# Patient Record
Sex: MALE | Race: WHITE | Employment: OTHER | URBAN - METROPOLITAN AREA
[De-identification: names, ages, dates, MRNs, and addresses within clinical notes are randomized per-mention and may not be internally consistent; named-entity substitution may affect disease eponyms.]

---

## 2020-12-08 ENCOUNTER — OFFICE VISIT (OUTPATIENT)
Dept: PRIMARY CARE CLINIC | Age: 70
End: 2020-12-08

## 2020-12-08 PROCEDURE — 99211 OFF/OP EST MAY X REQ PHY/QHP: CPT | Performed by: NURSE PRACTITIONER

## 2020-12-09 LAB — SARS-COV-2, NAA: NOT DETECTED

## 2020-12-23 ENCOUNTER — OFFICE VISIT (OUTPATIENT)
Dept: PRIMARY CARE CLINIC | Age: 70
End: 2020-12-23

## 2020-12-23 PROCEDURE — 99211 OFF/OP EST MAY X REQ PHY/QHP: CPT | Performed by: NURSE PRACTITIONER

## 2020-12-24 LAB — SARS-COV-2, NAA: NOT DETECTED

## 2022-06-12 ENCOUNTER — HOSPITAL ENCOUNTER (EMERGENCY)
Age: 72
Discharge: HOME | End: 2022-06-12
Payer: MEDICARE

## 2022-06-12 VITALS
RESPIRATION RATE: 16 BRPM | OXYGEN SATURATION: 95 % | DIASTOLIC BLOOD PRESSURE: 79 MMHG | HEIGHT: 72 IN | SYSTOLIC BLOOD PRESSURE: 151 MMHG | WEIGHT: 207.01 LBS | BODY MASS INDEX: 28.04 KG/M2 | TEMPERATURE: 97.9 F | HEART RATE: 61 BPM

## 2022-06-12 DIAGNOSIS — H00.036 EYELID ABSCESS, LEFT: Primary | ICD-10-CM

## 2022-06-12 PROCEDURE — 99283 EMERGENCY DEPT VISIT LOW MDM: CPT

## 2022-06-12 PROCEDURE — 2500000003 HC RX 250 WO HCPCS: Performed by: GENERAL ACUTE CARE HOSPITAL

## 2022-06-12 PROCEDURE — 6370000000 HC RX 637 (ALT 250 FOR IP): Performed by: GENERAL ACUTE CARE HOSPITAL

## 2022-06-12 RX ORDER — AMOXICILLIN AND CLAVULANATE POTASSIUM 875; 125 MG/1; MG/1
1 TABLET, FILM COATED ORAL ONCE
Status: COMPLETED | OUTPATIENT
Start: 2022-06-12 | End: 2022-06-12

## 2022-06-12 RX ORDER — PROPARACAINE HYDROCHLORIDE 5 MG/ML
1 SOLUTION/ DROPS OPHTHALMIC ONCE
Status: COMPLETED | OUTPATIENT
Start: 2022-06-12 | End: 2022-06-12

## 2022-06-12 RX ORDER — SULFACETAMIDE SODIUM 100 MG/ML
2 SOLUTION/ DROPS OPHTHALMIC 4 TIMES DAILY
Status: DISCONTINUED | OUTPATIENT
Start: 2022-06-12 | End: 2022-06-12 | Stop reason: HOSPADM

## 2022-06-12 RX ORDER — AMLODIPINE BESYLATE 5 MG/1
5 TABLET ORAL DAILY
COMMUNITY

## 2022-06-12 RX ORDER — AMOXICILLIN AND CLAVULANATE POTASSIUM 875; 125 MG/1; MG/1
1 TABLET, FILM COATED ORAL 2 TIMES DAILY
Qty: 20 TABLET | Refills: 0 | Status: SHIPPED | OUTPATIENT
Start: 2022-06-12 | End: 2022-06-22

## 2022-06-12 RX ORDER — AMOXICILLIN AND CLAVULANATE POTASSIUM 875; 125 MG/1; MG/1
1 TABLET, FILM COATED ORAL EVERY 12 HOURS SCHEDULED
Status: DISCONTINUED | OUTPATIENT
Start: 2022-06-12 | End: 2022-06-12

## 2022-06-12 RX ORDER — ATORVASTATIN CALCIUM 10 MG/1
10 TABLET, FILM COATED ORAL DAILY
COMMUNITY

## 2022-06-12 RX ORDER — ACETAMINOPHEN 500 MG
1000 TABLET ORAL ONCE
Status: COMPLETED | OUTPATIENT
Start: 2022-06-12 | End: 2022-06-12

## 2022-06-12 RX ORDER — GLYBURIDE 5 MG/1
10 TABLET ORAL
COMMUNITY

## 2022-06-12 RX ADMIN — FLUORESCEIN SODIUM 1 MG: 1 STRIP OPHTHALMIC at 09:00

## 2022-06-12 RX ADMIN — SULFACETAMIDE SODIUM 2 DROP: 100 SOLUTION/ DROPS OPHTHALMIC at 08:59

## 2022-06-12 RX ADMIN — ACETAMINOPHEN 1000 MG: 500 TABLET ORAL at 09:11

## 2022-06-12 RX ADMIN — PROPARACAINE HYDROCHLORIDE 1 DROP: 5 SOLUTION/ DROPS OPHTHALMIC at 09:00

## 2022-06-12 RX ADMIN — AMOXICILLIN AND CLAVULANATE POTASSIUM 1 TABLET: 875; 125 TABLET, FILM COATED ORAL at 09:11

## 2022-06-12 ASSESSMENT — PAIN DESCRIPTION - ORIENTATION: ORIENTATION: LEFT

## 2022-06-12 ASSESSMENT — PAIN DESCRIPTION - DESCRIPTORS: DESCRIPTORS: ACHING

## 2022-06-12 ASSESSMENT — VISUAL ACUITY
OD: 20/40
OS: 20/30
OU: 20/40

## 2022-06-12 ASSESSMENT — PAIN DESCRIPTION - LOCATION: LOCATION: EYE

## 2022-06-12 ASSESSMENT — PAIN SCALES - GENERAL: PAINLEVEL_OUTOF10: 7

## 2022-06-12 NOTE — ED PROVIDER NOTES
[address] [facility]        Pt Name: Vanna Turner  MRN: [id number]  [place] 1950  Date of evaluation: 6/12/2022  Provider: RENE Adan CNP  PCP: Willard Gaucher  Note Started: 8:49 AM EDT       CASSANDRA. I have evaluated this patient. My supervising physician was available for consultation. CHIEF COMPLAINT       Chief Complaint   Patient presents with    Eye Pain     lower left eye lid pain and swelling x 2 dys. mild swelling and tenderness        HISTORY OF PRESENT ILLNESS   (Location, Timing/Onset, Context/Setting, Quality, Duration, Modifying Factors, Severity, Associated Signs and Symptoms)  Note limiting factors. Chief Complaint: Eyelid pain, swelling, and discharge    Vanna Turner is a 73 y.o. male who presents to the emergency department today reporting 2-day history of left eyelid pain, swelling, and discharge. There has been no injury. He states that he has never had anything like this in the past.  He does not wear corrective lenses. Patient states that he woke up this morning and his eye was \"matted shut\". Patient reports using a warm washcloth to remove the exudate off of his eye. He reports pain when touching the affected area. He denies pain with eye movement. He denies decreased vision. There has been no nausea, vomiting, diarrhea. He denies fever, chills, or other symptoms. Nursing Notes were all reviewed and agreed with or any disagreements were addressed in the HPI. REVIEW OF SYSTEMS    (2-9 systems for level 4, 10 or more for level 5)     Review of Systems   Constitutional: Negative for chills and fever. HENT: Negative for congestion, ear discharge, ear pain, facial swelling, hearing loss, nosebleeds, sinus pressure, sinus pain, sore throat and voice change. Eyes: Positive for pain, discharge, redness and itching. Negative for photophobia and visual disturbance.    Respiratory: Negative for cough, chest tightness, shortness of breath and wheezing. Cardiovascular: Negative for chest pain and palpitations. Gastrointestinal: Negative for abdominal pain, nausea and vomiting. Endocrine: Negative for polydipsia and polyuria. Genitourinary: Negative for difficulty urinating, dysuria and flank pain. Musculoskeletal: Negative for back pain and neck pain. Skin: Negative for rash and wound. Allergic/Immunologic: Negative for immunocompromised state. Neurological: Negative for dizziness, weakness and light-headedness. Hematological: Does not bruise/bleed easily. Psychiatric/Behavioral: Negative for suicidal ideas. Positives and Pertinent negatives as per HPI. Except as noted above in the ROS, all other systems were reviewed and negative. PAST MEDICAL HISTORY     Past Medical History:   Diagnosis Date    Blood clots     Cancer ([facility] [address]     Diabetes     High blood pressure          SURGICAL HISTORY     Past Surgical History:   Procedure Laterality Date    PROSTATE SURGERY  jan 2006         [address]       Discharge Medication List as of 6/12/2022  8:59 AM      CONTINUE these medications which have NOT CHANGED    Details   atorvastatin (LIPITOR) 10 MG tablet Take 10 mg by mouth dailyHistorical Med      PREDNISONE PO Take by mouth Takes for goutHistorical Med      amLODIPine (NORVASC) 5 MG tablet Take 5 mg by mouth daily Not sure on doseHistorical Med      glyBURIDE (DIABETA) 5 MG tablet Take 10 mg by mouth daily (with breakfast)Historical Med      simvastatin (ZOCOR) 80 MG tablet Take 80 mg by mouth nightly. furosemide (LASIX) 20 MG tablet Take 20 mg by mouth.      metformin (GLUCOPHAGE) 500 MG tablet Take 500 mg by mouth.      lisinopril (PRINIVIL;ZESTRIL) 40 MG tablet Take 40 mg by mouth daily.                ALLERGIES     Allopurinol    FAMILYHISTORY       Family History   Problem Relation Age of Onset    Cancer Father     Diabetes Father     High Blood Pressure Father     Kidney Disease Father           SOCIAL HISTORY       Social History     Tobacco Use    Smoking status: Never Smoker    Smokeless tobacco: Never Used   Vaping Use    Vaping Use: Never used   Substance Use Topics    Alcohol use: Yes    Drug use: Never       SCREENINGS    Abhilash Coma Scale  Eye Opening: Spontaneous  Best Verbal Response: Oriented  Best Motor Response: Obeys commands  Abhilash Coma Scale Score: 15        PHYSICAL EXAM    (up to 7 for level 4, 8 or more for level 5)     ED Triage Vitals [06/12/22 0728]   BP Temp Temp Source Heart Rate Resp SpO2 Height Weight   (!) 151/79 97.9 °F (36.6 °C) Oral 61 16 95 % 6' (1.829 m) 207 lb 0.2 oz (93.9 kg)       Physical Exam  Vitals and nursing note reviewed. Constitutional:       General: He is not in acute distress. Appearance: Normal appearance. He is not ill-appearing. HENT:      Head: Normocephalic and atraumatic. Right Ear: External ear normal.      Left Ear: External ear normal.      Nose: Nose normal.      Mouth/Throat:      Mouth: Mucous membranes are moist.      Pharynx: Oropharynx is clear. Eyes:      General: Lids are everted, no foreign bodies appreciated. Vision grossly intact. Gaze aligned appropriately. No allergic shiner, visual field deficit or scleral icterus. Right eye: No foreign body or discharge. Left eye: Discharge present. No foreign body. Extraocular Movements: Extraocular movements intact. Left eye: Normal extraocular motion and no nystagmus. Conjunctiva/sclera:      Left eye: Left conjunctiva is injected. Exudate present. No chemosis or hemorrhage. Pupils: Pupils are equal, round, and reactive to light. Left eye: No corneal abrasion or fluorescein uptake. Helder exam negative. Comments: Approximate 0.5 cm abscess noted to left inner eyelid. There is tenderness to palpation. Copious amount of white discharge noted with palpation to this area.     Cardiovascular: Rate and Rhythm: Normal rate and regular rhythm. Pulses: Normal pulses. Heart sounds: Normal heart sounds. Pulmonary:      Effort: Pulmonary effort is normal. No respiratory distress. Breath sounds: Normal breath sounds. Abdominal:      Palpations: Abdomen is soft. Tenderness: There is no abdominal tenderness. Musculoskeletal:         General: No tenderness. Normal range of motion. Cervical back: Normal range of motion and neck supple. No rigidity or tenderness. Right lower leg: No edema. Left lower leg: No edema. Skin:     General: Skin is warm and dry. Capillary Refill: Capillary refill takes less than 2 seconds. Neurological:      General: No focal deficit present. Mental Status: He is alert and oriented to person, place, and time. Psychiatric:         Mood and Affect: Mood normal.         Behavior: Behavior normal.         Thought Content: Thought content normal.         Judgment: Judgment normal.         DIAGNOSTIC RESULTS   LABS:    Labs Reviewed - No data to display    When ordered only abnormal lab results are displayed. All other labs were within normal range or not returned as of this dictation. EKG: When ordered, EKG's are interpreted by the Emergency Department Physician in the absence of a cardiologist.  Please see their note for interpretation of EKG. RADIOLOGY:   Non-plain film images such as CT, Ultrasound and MRI are read by the radiologist. Plain radiographic images are visualized and preliminarily interpreted by the ED Provider with the below findings:        Interpretation per the Radiologist below, if available at the time of this note:    No orders to display     No results found.         PROCEDURES   Unless otherwise noted below, none     Procedures    CRITICAL CARE TIME   none    CONSULTS:  None      EMERGENCY DEPARTMENT COURSE and DIFFERENTIAL DIAGNOSIS/MDM:   Vitals:    Vitals:    06/12/22 0728   BP: (!) 151/79   Pulse: 61 Resp: 16   Temp: 97.9 °F (36.6 °C)   TempSrc: Oral   SpO2: 95%   Weight: 207 lb 0.2 oz (93.9 kg)   Height: 6' (1.829 m)       Patient was given the following medications:  Medications   proparacaine (ALCAINE) 0.5 % ophthalmic solution 1 drop (1 drop Left Eye Given by Other 6/12/22 0900)   fluorescein ophthalmic strip 1 mg (1 mg Left Eye Given by Other 6/12/22 0900)   acetaminophen (TYLENOL) tablet 1,000 mg (1,000 mg Oral Given 6/12/22 0911)   amoxicillin-clavulanate (AUGMENTIN) 875-125 MG per tablet 1 tablet (1 tablet Oral Given 6/12/22 0911)         Is this patient to be included in the SEP-1 Core Measure due to severe sepsis or septic shock? No   Exclusion criteria - the patient is NOT to be included for SEP-1 Core Measure due to:  2+ SIRS criteria are not met    Previous records reviewed in order to gain further information regarding patient's PMH is well as his HPI. Nursing notes reviewed. This is a 72-year-old male who presents for evaluation of 2-day history of left lower eyelid pain, swelling, and drainage. He denies known injury. Physical exam complete. Patient is nontoxic and afebrile. No signs or symptoms of acute distress noted. Patient is noted to have an approximate 0.5 cm abscess noted to the left inner lower eyelid. There is tenderness to palpation. Copious amount of whitish-yellow drainage noted with palpation to the affected area. Visual acuity intact. Patient medicated with first dose of Augmentin and Bleph-10 ophthalmic solution. At this time there is no evidence of any life-threatening or emergent conditions requiring immediate intervention. There is no evidence of periorbital or preseptal cellulitis. Patient will be discharged with emphasis on close outpatient follow-up. He agrees to take antibiotics as prescribed until gone. He is advised to apply warm compresses to the affected area for 20 minutes every 3-4 hours.   He agrees to follow-up with his eye specialist within the next 48 hours. He agrees return for high fever, incessant vomiting, severe pain, decreased vision from any other worsening symptoms. Patient is discharged home in stable condition. FINAL IMPRESSION      1.  Eyelid abscess, left          DISPOSITION/PLAN   DISPOSITION Decision To Discharge 06/12/2022 08:53:35 AM      PATIENT REFERRED TO:  Lashawn Cosme    In 2 days      your eye specialist    In 1 day        DISCHARGE MEDICATIONS:  Discharge Medication List as of 6/12/2022  8:59 AM      START taking these medications    Details   amoxicillin-clavulanate (AUGMENTIN) 875-125 MG per tablet Take 1 tablet by mouth 2 times daily for 10 days, Disp-20 tablet, R-0Print             DISCONTINUED MEDICATIONS:  Discharge Medication List as of 6/12/2022  8:59 AM      STOP taking these medications       amoxicillin (AMOXIL) 875 MG tablet Comments:   Reason for Stopping:         hydrocodone-acetaminophen (VICODIN) 5-500 MG per tablet Comments:   Reason for Stopping:         colchicine 0.6 MG tablet Comments:   Reason for Stopping:                      (Please note that portions of this note were completed with a voice recognition program.  Efforts were made to edit the dictations but occasionally words are mis-transcribed.)    RENE Bray CNP (electronically signed)            RENE Bray CNP  06/13/22 2011

## 2022-06-13 ASSESSMENT — ENCOUNTER SYMPTOMS
CHEST TIGHTNESS: 0
VOICE CHANGE: 0
FACIAL SWELLING: 0
EYE PAIN: 1
ABDOMINAL PAIN: 0
COUGH: 0
VOMITING: 0
SHORTNESS OF BREATH: 0
SINUS PRESSURE: 0
EYE ITCHING: 1
BACK PAIN: 0
WHEEZING: 0
PHOTOPHOBIA: 0
EYE DISCHARGE: 1
EYE REDNESS: 1
SORE THROAT: 0
SINUS PAIN: 0
NAUSEA: 0

## 2022-06-13 ASSESSMENT — VISUAL ACUITY: OU: 1
